# Patient Record
Sex: FEMALE | Race: OTHER | NOT HISPANIC OR LATINO
[De-identification: names, ages, dates, MRNs, and addresses within clinical notes are randomized per-mention and may not be internally consistent; named-entity substitution may affect disease eponyms.]

---

## 2017-05-02 ENCOUNTER — MEDICATION RENEWAL (OUTPATIENT)
Age: 37
End: 2017-05-02

## 2017-06-27 ENCOUNTER — OUTPATIENT (OUTPATIENT)
Dept: OUTPATIENT SERVICES | Facility: HOSPITAL | Age: 37
LOS: 1 days | Discharge: HOME | End: 2017-06-27

## 2017-06-27 DIAGNOSIS — Z85.048 PERSONAL HISTORY OF OTHER MALIGNANT NEOPLASM OF RECTUM, RECTOSIGMOID JUNCTION, AND ANUS: Chronic | ICD-10-CM

## 2017-06-27 DIAGNOSIS — C20 MALIGNANT NEOPLASM OF RECTUM: ICD-10-CM

## 2017-06-27 DIAGNOSIS — Z98.89 OTHER SPECIFIED POSTPROCEDURAL STATES: Chronic | ICD-10-CM

## 2017-07-03 ENCOUNTER — APPOINTMENT (OUTPATIENT)
Dept: HEMATOLOGY ONCOLOGY | Facility: CLINIC | Age: 37
End: 2017-07-03

## 2017-07-03 ENCOUNTER — RESULT REVIEW (OUTPATIENT)
Age: 37
End: 2017-07-03

## 2017-07-03 ENCOUNTER — APPOINTMENT (OUTPATIENT)
Dept: GERIATRICS | Facility: CLINIC | Age: 37
End: 2017-07-03

## 2017-07-03 VITALS
BODY MASS INDEX: 23.38 KG/M2 | WEIGHT: 137.13 LBS | TEMPERATURE: 98.4 F | HEART RATE: 60 BPM | OXYGEN SATURATION: 97 % | DIASTOLIC BLOOD PRESSURE: 71 MMHG | SYSTOLIC BLOOD PRESSURE: 110 MMHG | RESPIRATION RATE: 16 BRPM

## 2017-07-03 LAB
BASOPHILS # BLD AUTO: 0 K/UL — SIGNIFICANT CHANGE UP (ref 0–0.2)
EOSINOPHIL # BLD AUTO: 0 K/UL — SIGNIFICANT CHANGE UP (ref 0–0.5)
EOSINOPHIL NFR BLD AUTO: 3 % — SIGNIFICANT CHANGE UP (ref 0–6)
HCT VFR BLD CALC: 36.7 % — SIGNIFICANT CHANGE UP (ref 34.5–45)
HGB BLD-MCNC: 12.6 G/DL — SIGNIFICANT CHANGE UP (ref 11.5–15.5)
LYMPHOCYTES # BLD AUTO: 0.9 K/UL — LOW (ref 1–3.3)
LYMPHOCYTES # BLD AUTO: 34 % — SIGNIFICANT CHANGE UP (ref 13–44)
MCHC RBC-ENTMCNC: 32.6 PG — SIGNIFICANT CHANGE UP (ref 27–34)
MCHC RBC-ENTMCNC: 34.2 G/DL — SIGNIFICANT CHANGE UP (ref 32–36)
MCV RBC AUTO: 95.2 FL — SIGNIFICANT CHANGE UP (ref 80–100)
MONOCYTES # BLD AUTO: 0.2 K/UL — SIGNIFICANT CHANGE UP (ref 0–0.9)
MONOCYTES NFR BLD AUTO: 5 % — SIGNIFICANT CHANGE UP (ref 2–14)
NEUTROPHILS # BLD AUTO: 1.5 K/UL — LOW (ref 1.8–7.4)
NEUTROPHILS NFR BLD AUTO: 58 % — SIGNIFICANT CHANGE UP (ref 43–77)
PLAT MORPH BLD: NORMAL — SIGNIFICANT CHANGE UP
PLATELET # BLD AUTO: 218 K/UL — SIGNIFICANT CHANGE UP (ref 150–400)
RBC # BLD: 3.86 M/UL — SIGNIFICANT CHANGE UP (ref 3.8–5.2)
RBC # FLD: 10.9 % — SIGNIFICANT CHANGE UP (ref 10.3–14.5)
RBC BLD AUTO: SIGNIFICANT CHANGE UP
WBC # BLD: 2.6 K/UL — LOW (ref 3.8–10.5)
WBC # FLD AUTO: 2.6 K/UL — LOW (ref 3.8–10.5)

## 2017-07-26 ENCOUNTER — FORM ENCOUNTER (OUTPATIENT)
Age: 37
End: 2017-07-26

## 2017-07-27 ENCOUNTER — OUTPATIENT (OUTPATIENT)
Dept: OUTPATIENT SERVICES | Facility: HOSPITAL | Age: 37
LOS: 1 days | End: 2017-07-27
Payer: COMMERCIAL

## 2017-07-27 DIAGNOSIS — Z98.89 OTHER SPECIFIED POSTPROCEDURAL STATES: Chronic | ICD-10-CM

## 2017-07-27 DIAGNOSIS — C20 MALIGNANT NEOPLASM OF RECTUM: ICD-10-CM

## 2017-07-27 DIAGNOSIS — Z85.048 PERSONAL HISTORY OF OTHER MALIGNANT NEOPLASM OF RECTUM, RECTOSIGMOID JUNCTION, AND ANUS: Chronic | ICD-10-CM

## 2017-07-27 PROCEDURE — 36590 REMOVAL TUNNELED CV CATH: CPT

## 2017-07-31 DIAGNOSIS — Z45.2 ENCOUNTER FOR ADJUSTMENT AND MANAGEMENT OF VASCULAR ACCESS DEVICE: ICD-10-CM

## 2017-07-31 DIAGNOSIS — C20 MALIGNANT NEOPLASM OF RECTUM: ICD-10-CM

## 2017-08-31 RX ORDER — SENNOSIDES 8.6 MG/1
8.6 CAPSULE, GELATIN COATED ORAL
Qty: 1 | Refills: 2 | Status: ACTIVE | COMMUNITY
Start: 2017-07-03 | End: 1900-01-01

## 2017-12-07 ENCOUNTER — APPOINTMENT (OUTPATIENT)
Dept: GERIATRICS | Facility: CLINIC | Age: 37
End: 2017-12-07

## 2017-12-11 ENCOUNTER — APPOINTMENT (OUTPATIENT)
Dept: GERIATRICS | Facility: CLINIC | Age: 37
End: 2017-12-11
Payer: COMMERCIAL

## 2017-12-11 VITALS
BODY MASS INDEX: 23.04 KG/M2 | WEIGHT: 135.14 LBS | DIASTOLIC BLOOD PRESSURE: 64 MMHG | RESPIRATION RATE: 16 BRPM | SYSTOLIC BLOOD PRESSURE: 113 MMHG | HEART RATE: 77 BPM | TEMPERATURE: 98.2 F | OXYGEN SATURATION: 100 %

## 2017-12-11 PROCEDURE — 99215 OFFICE O/P EST HI 40 MIN: CPT

## 2018-03-19 ENCOUNTER — APPOINTMENT (OUTPATIENT)
Dept: COLORECTAL SURGERY | Facility: CLINIC | Age: 38
End: 2018-03-19
Payer: COMMERCIAL

## 2018-03-19 ENCOUNTER — LABORATORY RESULT (OUTPATIENT)
Age: 38
End: 2018-03-19

## 2018-03-19 PROCEDURE — 99214 OFFICE O/P EST MOD 30 MIN: CPT

## 2018-03-19 RX ORDER — CEPHALEXIN 500 MG/1
500 CAPSULE ORAL
Qty: 14 | Refills: 0 | Status: DISCONTINUED | COMMUNITY
Start: 2017-07-31 | End: 2018-03-19

## 2018-05-07 ENCOUNTER — APPOINTMENT (OUTPATIENT)
Dept: COLORECTAL SURGERY | Facility: CLINIC | Age: 38
End: 2018-05-07
Payer: COMMERCIAL

## 2018-05-07 PROCEDURE — 99213 OFFICE O/P EST LOW 20 MIN: CPT

## 2018-05-10 ENCOUNTER — APPOINTMENT (OUTPATIENT)
Dept: COLORECTAL SURGERY | Facility: CLINIC | Age: 38
End: 2018-05-10

## 2018-05-10 ENCOUNTER — APPOINTMENT (OUTPATIENT)
Dept: COLORECTAL SURGERY | Facility: CLINIC | Age: 38
End: 2018-05-10
Payer: COMMERCIAL

## 2018-05-10 DIAGNOSIS — K62.89 OTHER SPECIFIED DISEASES OF ANUS AND RECTUM: ICD-10-CM

## 2018-05-10 DIAGNOSIS — Z98.890 OTHER SPECIFIED POSTPROCEDURAL STATES: ICD-10-CM

## 2018-05-10 DIAGNOSIS — K62.5 HEMORRHAGE OF ANUS AND RECTUM: ICD-10-CM

## 2018-05-10 PROCEDURE — 99213 OFFICE O/P EST LOW 20 MIN: CPT | Mod: 25

## 2018-05-10 PROCEDURE — 45378 DIAGNOSTIC COLONOSCOPY: CPT

## 2018-11-01 ENCOUNTER — APPOINTMENT (OUTPATIENT)
Dept: GERIATRICS | Facility: CLINIC | Age: 38
End: 2018-11-01
Payer: COMMERCIAL

## 2018-11-01 VITALS
DIASTOLIC BLOOD PRESSURE: 60 MMHG | RESPIRATION RATE: 15 BRPM | HEIGHT: 64.21 IN | WEIGHT: 149 LBS | OXYGEN SATURATION: 98 % | BODY MASS INDEX: 25.44 KG/M2 | SYSTOLIC BLOOD PRESSURE: 90 MMHG | HEART RATE: 89 BPM | TEMPERATURE: 98.7 F

## 2018-11-01 PROCEDURE — G0008: CPT

## 2018-11-01 PROCEDURE — 99214 OFFICE O/P EST MOD 30 MIN: CPT | Mod: 25

## 2018-11-01 PROCEDURE — 90688 IIV4 VACCINE SPLT 0.5 ML IM: CPT

## 2018-12-06 ENCOUNTER — APPOINTMENT (OUTPATIENT)
Dept: GERIATRICS | Facility: CLINIC | Age: 38
End: 2018-12-06

## 2019-01-05 ENCOUNTER — RX RENEWAL (OUTPATIENT)
Age: 39
End: 2019-01-05

## 2019-02-11 ENCOUNTER — RX RENEWAL (OUTPATIENT)
Age: 39
End: 2019-02-11

## 2019-03-21 ENCOUNTER — RX RENEWAL (OUTPATIENT)
Age: 39
End: 2019-03-21

## 2019-04-22 ENCOUNTER — RX RENEWAL (OUTPATIENT)
Age: 39
End: 2019-04-22

## 2019-05-21 ENCOUNTER — RX RENEWAL (OUTPATIENT)
Age: 39
End: 2019-05-21

## 2019-06-21 ENCOUNTER — RX RENEWAL (OUTPATIENT)
Age: 39
End: 2019-06-21

## 2019-07-22 ENCOUNTER — RX RENEWAL (OUTPATIENT)
Age: 39
End: 2019-07-22

## 2019-08-22 ENCOUNTER — RX RENEWAL (OUTPATIENT)
Age: 39
End: 2019-08-22

## 2019-08-28 ENCOUNTER — APPOINTMENT (OUTPATIENT)
Dept: HEMATOLOGY ONCOLOGY | Facility: CLINIC | Age: 39
End: 2019-08-28
Payer: COMMERCIAL

## 2019-08-28 ENCOUNTER — OUTPATIENT (OUTPATIENT)
Dept: OUTPATIENT SERVICES | Facility: HOSPITAL | Age: 39
LOS: 1 days | Discharge: HOME | End: 2019-08-28

## 2019-08-28 VITALS
TEMPERATURE: 98.7 F | OXYGEN SATURATION: 100 % | SYSTOLIC BLOOD PRESSURE: 107 MMHG | RESPIRATION RATE: 16 BRPM | HEART RATE: 71 BPM | BODY MASS INDEX: 24.81 KG/M2 | DIASTOLIC BLOOD PRESSURE: 69 MMHG | WEIGHT: 145.48 LBS

## 2019-08-28 DIAGNOSIS — Z85.048 PERSONAL HISTORY OF OTHER MALIGNANT NEOPLASM OF RECTUM, RECTOSIGMOID JUNCTION, AND ANUS: Chronic | ICD-10-CM

## 2019-08-28 DIAGNOSIS — C20 MALIGNANT NEOPLASM OF RECTUM: ICD-10-CM

## 2019-08-28 DIAGNOSIS — Z98.89 OTHER SPECIFIED POSTPROCEDURAL STATES: Chronic | ICD-10-CM

## 2019-08-28 PROCEDURE — 99213 OFFICE O/P EST LOW 20 MIN: CPT

## 2019-08-29 NOTE — HISTORY OF PRESENT ILLNESS
[FreeTextEntry1] : 39yoF with rectal adenocarcinoma (dx 7/2015) s/p neoadjuvant chemo with Capcetabine, resection with loop ileostomy (12/2015) and adjuvant chemo with XELOX with subsequent ileostomy closure (7/2016) who presents for palliative care follow up. Patient last seen by me 11/2018. \par \par Patient's remains on Methadone 5mg TID for her CIPN. She previously tried to taper down however her pains/cramping worsened and she went back to usual dose.  She uses occasional NSAID. Cold temperatures cause her to experience muscle stiffness/cramping. \par \par ROS:\par Denies nausea/vomiting, constipation.  Occasionally gets bloated/gassy and this can exacerbate pain at surgical site. All other ROS negative.\par \par Works full time in a dental office. \par \par I-Stop Ref#: [id number]

## 2019-08-29 NOTE — ASSESSMENT
[FreeTextEntry1] : 39yoF with:\par \par 1. Oxaliplatin-induced peripheral neuropathy, opioid-responsive - Again discussed goal of doing a slow taper of Methadone with goal of ultimately ceasing opioid use altogether. Patient is agreeable. Adjust dosing to 5/2.5/5.  Can use PRN NSAID or Tylenol to help with muscle cramping. \par \par 2. Rectal adenocarcinoma - s/p therapies, now with ANGEL. Surveillance as Surg Onc/Med Onc.  Last scan 3/2018, last colonoscopy 5/2018.  Advised patient of importance of regular oncologic surveillance. \par \par 3. HCM - Advised patient to establish with a PCP and Gynecologist for well care. \par \par RTO 6 months, call to update on status of taper

## 2019-08-29 NOTE — PHYSICAL EXAM
[General Appearance - Alert] : alert [General Appearance - In No Acute Distress] : in no acute distress [Sclera] : the sclera and conjunctiva were normal [Normal Oral Mucosa] : normal oral mucosa [Neck Appearance] : the appearance of the neck was normal [] : no respiratory distress [Heart Rate And Rhythm] : heart rate was normal and rhythm regular [Auscultation Breath Sounds / Voice Sounds] : lungs were clear to auscultation bilaterally [Heart Sounds] : normal S1 and S2 [Edema] : there was no peripheral edema [Bowel Sounds] : normal bowel sounds [Abnormal Walk] : normal gait [Skin Color & Pigmentation] : normal skin color and pigmentation [Oriented To Time, Place, And Person] : oriented to person, place, and time [FreeTextEntry1] : normal proprioception of b/l toes

## 2019-09-27 ENCOUNTER — RX RENEWAL (OUTPATIENT)
Age: 39
End: 2019-09-27

## 2019-10-22 ENCOUNTER — OUTPATIENT (OUTPATIENT)
Dept: OUTPATIENT SERVICES | Facility: HOSPITAL | Age: 39
LOS: 1 days | Discharge: HOME | End: 2019-10-22

## 2019-10-22 DIAGNOSIS — C20 MALIGNANT NEOPLASM OF RECTUM: ICD-10-CM

## 2019-10-22 DIAGNOSIS — Z85.048 PERSONAL HISTORY OF OTHER MALIGNANT NEOPLASM OF RECTUM, RECTOSIGMOID JUNCTION, AND ANUS: Chronic | ICD-10-CM

## 2019-10-22 DIAGNOSIS — Z98.89 OTHER SPECIFIED POSTPROCEDURAL STATES: Chronic | ICD-10-CM

## 2019-11-01 ENCOUNTER — APPOINTMENT (OUTPATIENT)
Dept: HEMATOLOGY ONCOLOGY | Facility: CLINIC | Age: 39
End: 2019-11-01
Payer: COMMERCIAL

## 2019-11-01 ENCOUNTER — RESULT REVIEW (OUTPATIENT)
Age: 39
End: 2019-11-01

## 2019-11-01 VITALS
DIASTOLIC BLOOD PRESSURE: 74 MMHG | WEIGHT: 146.17 LBS | OXYGEN SATURATION: 98 % | HEART RATE: 80 BPM | TEMPERATURE: 98.1 F | BODY MASS INDEX: 24.93 KG/M2 | RESPIRATION RATE: 17 BRPM | SYSTOLIC BLOOD PRESSURE: 115 MMHG

## 2019-11-01 LAB
ALBUMIN SERPL ELPH-MCNC: 4.6 G/DL
ALP BLD-CCNC: 49 U/L
ALT SERPL-CCNC: 6 U/L
ANION GAP SERPL CALC-SCNC: 13 MMOL/L
AST SERPL-CCNC: 14 U/L
BASOPHILS # BLD AUTO: 0 K/UL — SIGNIFICANT CHANGE UP (ref 0–0.2)
BASOPHILS NFR BLD AUTO: 1 % — SIGNIFICANT CHANGE UP (ref 0–2)
BILIRUB SERPL-MCNC: <0.2 MG/DL
BUN SERPL-MCNC: 13 MG/DL
CALCIUM SERPL-MCNC: 9.5 MG/DL
CEA SERPL-MCNC: 1.1 NG/ML
CHLORIDE SERPL-SCNC: 103 MMOL/L
CO2 SERPL-SCNC: 26 MMOL/L
CREAT SERPL-MCNC: 0.69 MG/DL
EOSINOPHIL # BLD AUTO: 0.1 K/UL — SIGNIFICANT CHANGE UP (ref 0–0.5)
EOSINOPHIL NFR BLD AUTO: 2.5 % — SIGNIFICANT CHANGE UP (ref 0–6)
GLUCOSE SERPL-MCNC: 89 MG/DL
HCT VFR BLD CALC: 38.5 % — SIGNIFICANT CHANGE UP (ref 34.5–45)
HGB BLD-MCNC: 12.7 G/DL — SIGNIFICANT CHANGE UP (ref 11.5–15.5)
LYMPHOCYTES # BLD AUTO: 1.1 K/UL — SIGNIFICANT CHANGE UP (ref 1–3.3)
LYMPHOCYTES # BLD AUTO: 31.5 % — SIGNIFICANT CHANGE UP (ref 13–44)
MCHC RBC-ENTMCNC: 31.6 PG — SIGNIFICANT CHANGE UP (ref 27–34)
MCHC RBC-ENTMCNC: 32.9 G/DL — SIGNIFICANT CHANGE UP (ref 32–36)
MCV RBC AUTO: 95.9 FL — SIGNIFICANT CHANGE UP (ref 80–100)
MONOCYTES # BLD AUTO: 0.2 K/UL — SIGNIFICANT CHANGE UP (ref 0–0.9)
MONOCYTES NFR BLD AUTO: 5 % — SIGNIFICANT CHANGE UP (ref 2–14)
NEUTROPHILS # BLD AUTO: 2.1 K/UL — SIGNIFICANT CHANGE UP (ref 1.8–7.4)
NEUTROPHILS NFR BLD AUTO: 60 % — SIGNIFICANT CHANGE UP (ref 43–77)
PLATELET # BLD AUTO: 215 K/UL — SIGNIFICANT CHANGE UP (ref 150–400)
POTASSIUM SERPL-SCNC: 3.9 MMOL/L
PROT SERPL-MCNC: 7.2 G/DL
RBC # BLD: 4.02 M/UL — SIGNIFICANT CHANGE UP (ref 3.8–5.2)
RBC # FLD: 10.9 % — SIGNIFICANT CHANGE UP (ref 10.3–14.5)
SODIUM SERPL-SCNC: 142 MMOL/L
WBC # BLD: 3.5 K/UL — LOW (ref 3.8–10.5)
WBC # FLD AUTO: 3.5 K/UL — LOW (ref 3.8–10.5)

## 2019-11-01 PROCEDURE — 99215 OFFICE O/P EST HI 40 MIN: CPT

## 2019-11-01 NOTE — PHYSICAL EXAM
[Fully active, able to carry on all pre-disease performance without restriction] : Status 0 - Fully active, able to carry on all pre-disease performance without restriction [Normal] : affect appropriate [de-identified] : wound healing well, nontender and soft, ostomy bag in place

## 2019-11-01 NOTE — ASSESSMENT
[FreeTextEntry1] : Real Kimble is a 39 years old female with newly diagnosed locally advanced rectal adenocarcinoma with clinical stage T3N2Mx completed concurrent capecitabine and radiation on 10/26 and rectal cancer resection on Dec 1, 2015 s/p C6 XELOX adjuvant chemo in the beginning of May, 2016. \par \par Plan\par \par had a discussion with the patient around 40 minutes, she needs H and P every three months in first two years, every 6 months up to 5 years\par CT c/a/p in every 6 months in first two years, annual after up to 5 years, needs colonoscopy year 1, then year 3 and 5 if normal\par will have CT chest/abdomen/pelvis w/con\par will need to see her GI for recent blood in the stools\par check labs including CEA today\par recommend genetic counseling given young age with rectal cancer although no family history\par RTC in six months\par \par \par

## 2019-11-01 NOTE — DISCUSSION/SUMMARY
[FreeTextEntry1] : Spoke with her  to make a follow up appointment with me ASAP given last surveillance scans in August 2016. He promised to do it soon.

## 2019-11-01 NOTE — RESULTS/DATA
[FreeTextEntry1] : July 21, 2015\par CBC 3.7>11.4/38.0<331\par Bun/Cr 6/0.64\par LFT wnl\par \par 10/29\par CBC: 3.4>11.2<190\par \par \par

## 2019-11-01 NOTE — REVIEW OF SYSTEMS
[Constipation] : constipation [Joint Pain] : joint pain [Negative] : Allergic/Immunologic [Fever] : no fever [Fatigue] : no fatigue [Recent Change In Weight] : ~T no recent weight change [Shortness Of Breath] : no shortness of breath [SOB on Exertion] : no shortness of breath during exertion [Suicidal] : not suicidal [Insomnia] : no insomnia [Anxiety] : no anxiety [Depression] : no depression

## 2019-11-01 NOTE — HISTORY OF PRESENT ILLNESS
[T: ___] : T[unfilled] [N: ___] : N[unfilled] [M: ___] : M[unfilled] [de-identified] : Real Kimble is a 35 years old female who initially presented with fresh red blood in the stool in February, 2015, associated with rectal pain. The patient thought it was just hemorrhoids. Until she felt weak, tired with loss of 50lbs in 6 months. She does not have good appetite . She went to see hemorrhoids physician who referred patient to see GI physician Dr. Daren Gifford. She underwent colonoscopy and was found to have a large, fungating, partially obstructing mass in the rectum, close to the anal verge. BIopsy demonstrated invasive moderately differentiated adenocarcinoma. MRI abdomen/pelvis revealed a 5.1cm circumferential mass in the mid rectum, numerous subcentimeter abnormal morphology nodes in the mesorectal fascia, within the deep right pelvis outside of the mesorectal fascia and in the presacral space.  [de-identified] : rectal adenocarcinoma [de-identified] : CEA 7/21/15: 0.9 [de-identified] : The patient underwent anterior resection with a diverting loop ileostomy on Dec 1, 2015. Surgical pathology demonstrated segment of colon negative for malignant neoplasm and 20 lymph nodes negative for tumor, pT0N0. Patient denies any abdominal pain. \par \par She had ileostomy closed surgery in the end of July, 2016, developed anal pain when having bowel movement. CT-Scan Abd/pelvis/chest of 08/16/16 shows:  Interval  ileostomy  reversal. Presacral  fluid  has  mildly  decreased.  No  metastatic  disease.\par \par S/p XELOX /radiation completion in May 2016. She is feeling fine except she is c/o difficulty with bowel movement and she takes extremely long time to have bowel movement. Admits she has + constipation and + blood in the stools in last a couple of months. Also, she is still requiring pain medication Methadone 5 mg tid 2/2 to persistent numbness/tingling. Denies any fever, diarrhea, nausea and her appetite is good. \par \par 11/1/2019 she has lost her follow up in greater than two years. She feels fine, denies blood in the stool, still complains of tingling and numbness in her fingers and toes, currently on methadone 5mg TID, follows up with Dr. Cheatham. She had last surveillance CT scan on March 24, 2018 which did not show evidence of recurrence. Last colonoscopy In May 2018 showed normal colon. She has lost period in 3 years. \par

## 2019-12-06 ENCOUNTER — RX RENEWAL (OUTPATIENT)
Age: 39
End: 2019-12-06

## 2020-01-10 ENCOUNTER — RX RENEWAL (OUTPATIENT)
Age: 40
End: 2020-01-10

## 2020-03-09 ENCOUNTER — OUTPATIENT (OUTPATIENT)
Dept: OUTPATIENT SERVICES | Facility: HOSPITAL | Age: 40
LOS: 1 days | Discharge: HOME | End: 2020-03-09

## 2020-03-09 DIAGNOSIS — C20 MALIGNANT NEOPLASM OF RECTUM: ICD-10-CM

## 2020-03-09 DIAGNOSIS — Z98.89 OTHER SPECIFIED POSTPROCEDURAL STATES: Chronic | ICD-10-CM

## 2020-03-09 DIAGNOSIS — Z85.048 PERSONAL HISTORY OF OTHER MALIGNANT NEOPLASM OF RECTUM, RECTOSIGMOID JUNCTION, AND ANUS: Chronic | ICD-10-CM

## 2020-03-11 ENCOUNTER — APPOINTMENT (OUTPATIENT)
Dept: HEMATOLOGY ONCOLOGY | Facility: CLINIC | Age: 40
End: 2020-03-11
Payer: COMMERCIAL

## 2020-03-11 VITALS
HEART RATE: 64 BPM | TEMPERATURE: 98.7 F | BODY MASS INDEX: 25.3 KG/M2 | OXYGEN SATURATION: 99 % | RESPIRATION RATE: 16 BRPM | SYSTOLIC BLOOD PRESSURE: 106 MMHG | DIASTOLIC BLOOD PRESSURE: 68 MMHG | WEIGHT: 148.37 LBS

## 2020-03-11 PROCEDURE — 99214 OFFICE O/P EST MOD 30 MIN: CPT

## 2020-03-12 NOTE — PHYSICAL EXAM
[General Appearance - Alert] : alert [General Appearance - In No Acute Distress] : in no acute distress [Sclera] : the sclera and conjunctiva were normal [Normal Oral Mucosa] : normal oral mucosa [Neck Appearance] : the appearance of the neck was normal [] : no respiratory distress [Auscultation Breath Sounds / Voice Sounds] : lungs were clear to auscultation bilaterally [Heart Rate And Rhythm] : heart rate was normal and rhythm regular [Heart Sounds] : normal S1 and S2 [Edema] : there was no peripheral edema [Bowel Sounds] : normal bowel sounds [Abnormal Walk] : normal gait [Skin Color & Pigmentation] : normal skin color and pigmentation [Oriented To Time, Place, And Person] : oriented to person, place, and time [FreeTextEntry1] : normal proprioception of b/l toes

## 2020-03-12 NOTE — ASSESSMENT
[FreeTextEntry1] : 39yoF with:\par \par 1. Oxaliplatin-induced peripheral neuropathy, opioid-responsive - Re-discussed goal of doing a slow taper of Methadone with goal of ultimately ceasing opioid use altogether. Patient is agreeable. Adjust dosing to 5mg BID.  Can use PRN NSAID or Tylenol to help with muscle cramping. \par \par 2. Opioid-induced constipation - C/w lactulose PRN.\par \par 3. Rectal adenocarcinoma - s/p therapies, now with ANGEL. Surveillance as Surg Onc/Med Onc. Last scan 3/2018, last colonoscopy 5/2018.   Is aware that scans were ordered 11/2019.  Advised patient of importance of obtaining scans urgently especially given episode of blood in stool.   Scans re-ordered. \par \par 4. HCM - Advised patient to establish with a PCP and Gynecologist for well care. \par \par \par RTO 6 months, call to update on status of taper.

## 2020-03-12 NOTE — HISTORY OF PRESENT ILLNESS
[FreeTextEntry1] : 39yoF with rectal adenocarcinoma (dx 7/2015) s/p neoadjuvant chemo with Capcetabine, resection with loop ileostomy (12/2015) and adjuvant chemo with XELOX with subsequent ileostomy closure (7/2016) who presents for palliative care follow up. Patient last seen by me 8/2019. \par \par Patient's remains on Methadone 5mg TID for her CIPN. She previously tried to taper down however her pains/cramping worsened and she went back to usual dose.  She uses occasional NSAID. Cold temperatures cause her to experience muscle stiffness/cramping. \par \par Patient reports an isolated episode of BRBPR two days ago.  Scans were ordered in November.  She has not obtained as of yet. \par \par ROS:\par Denies nausea/vomiting, constipation.  Occasionally gets bloated/gassy and this can exacerbate pain at surgical site. \par All other ROS negative.\par \par She is , has two children.  Works full time in a dental office. \par \par I-Stop Ref#: [id number]

## 2020-03-20 ENCOUNTER — APPOINTMENT (OUTPATIENT)
Dept: CT IMAGING | Facility: CLINIC | Age: 40
End: 2020-03-20

## 2020-04-24 ENCOUNTER — OUTPATIENT (OUTPATIENT)
Dept: OUTPATIENT SERVICES | Facility: HOSPITAL | Age: 40
LOS: 1 days | Discharge: HOME | End: 2020-04-24

## 2020-04-24 DIAGNOSIS — Z85.048 PERSONAL HISTORY OF OTHER MALIGNANT NEOPLASM OF RECTUM, RECTOSIGMOID JUNCTION, AND ANUS: Chronic | ICD-10-CM

## 2020-04-24 DIAGNOSIS — Z98.89 OTHER SPECIFIED POSTPROCEDURAL STATES: Chronic | ICD-10-CM

## 2020-04-24 DIAGNOSIS — C20 MALIGNANT NEOPLASM OF RECTUM: ICD-10-CM

## 2020-05-01 ENCOUNTER — APPOINTMENT (OUTPATIENT)
Dept: HEMATOLOGY ONCOLOGY | Facility: CLINIC | Age: 40
End: 2020-05-01

## 2020-06-12 ENCOUNTER — RESULT REVIEW (OUTPATIENT)
Age: 40
End: 2020-06-12

## 2020-06-12 ENCOUNTER — OUTPATIENT (OUTPATIENT)
Dept: OUTPATIENT SERVICES | Facility: HOSPITAL | Age: 40
LOS: 1 days | End: 2020-06-12
Payer: COMMERCIAL

## 2020-06-12 ENCOUNTER — APPOINTMENT (OUTPATIENT)
Dept: CT IMAGING | Facility: CLINIC | Age: 40
End: 2020-06-12
Payer: COMMERCIAL

## 2020-06-12 DIAGNOSIS — Z85.048 PERSONAL HISTORY OF OTHER MALIGNANT NEOPLASM OF RECTUM, RECTOSIGMOID JUNCTION, AND ANUS: Chronic | ICD-10-CM

## 2020-06-12 DIAGNOSIS — Z00.8 ENCOUNTER FOR OTHER GENERAL EXAMINATION: ICD-10-CM

## 2020-06-12 DIAGNOSIS — Z98.89 OTHER SPECIFIED POSTPROCEDURAL STATES: Chronic | ICD-10-CM

## 2020-06-12 PROCEDURE — 74177 CT ABD & PELVIS W/CONTRAST: CPT | Mod: 26

## 2020-06-12 PROCEDURE — 71260 CT THORAX DX C+: CPT

## 2020-06-12 PROCEDURE — 71260 CT THORAX DX C+: CPT | Mod: 26

## 2020-06-12 PROCEDURE — 74177 CT ABD & PELVIS W/CONTRAST: CPT

## 2020-06-23 ENCOUNTER — OUTPATIENT (OUTPATIENT)
Dept: OUTPATIENT SERVICES | Facility: HOSPITAL | Age: 40
LOS: 1 days | Discharge: HOME | End: 2020-06-23

## 2020-06-23 DIAGNOSIS — Z85.048 PERSONAL HISTORY OF OTHER MALIGNANT NEOPLASM OF RECTUM, RECTOSIGMOID JUNCTION, AND ANUS: Chronic | ICD-10-CM

## 2020-06-23 DIAGNOSIS — Z98.89 OTHER SPECIFIED POSTPROCEDURAL STATES: Chronic | ICD-10-CM

## 2020-06-23 DIAGNOSIS — C20 MALIGNANT NEOPLASM OF RECTUM: ICD-10-CM

## 2020-07-10 ENCOUNTER — APPOINTMENT (OUTPATIENT)
Dept: HEMATOLOGY ONCOLOGY | Facility: CLINIC | Age: 40
End: 2020-07-10
Payer: COMMERCIAL

## 2020-07-10 PROCEDURE — 99214 OFFICE O/P EST MOD 30 MIN: CPT | Mod: 95

## 2020-07-10 NOTE — HISTORY OF PRESENT ILLNESS
[Home] : at home, [unfilled] , at the time of the visit. [Medical Office: ([facility])___] : at the medical office located in  [T: ___] : T[unfilled] [N: ___] : N[unfilled] [M: ___] : M[unfilled] [de-identified] : Real Kimble is a 35 years old female who initially presented with fresh red blood in the stool in February, 2015, associated with rectal pain. The patient thought it was just hemorrhoids. Until she felt weak, tired with loss of 50lbs in 6 months. She does not have good appetite . She went to see hemorrhoids physician who referred patient to see GI physician Dr. Daren Gifford. She underwent colonoscopy and was found to have a large, fungating, partially obstructing mass in the rectum, close to the anal verge. BIopsy demonstrated invasive moderately differentiated adenocarcinoma. MRI abdomen/pelvis revealed a 5.1cm circumferential mass in the mid rectum, numerous subcentimeter abnormal morphology nodes in the mesorectal fascia, within the deep right pelvis outside of the mesorectal fascia and in the presacral space.  [de-identified] : rectal adenocarcinoma [de-identified] : CEA 7/21/15: 0.9 [de-identified] : The patient underwent anterior resection with a diverting loop ileostomy on Dec 1, 2015. Surgical pathology demonstrated segment of colon negative for malignant neoplasm and 20 lymph nodes negative for tumor, pT0N0. Patient denies any abdominal pain. \par \par She had ileostomy closed surgery in the end of July, 2016, developed anal pain when having bowel movement. CT-Scan Abd/pelvis/chest of 08/16/16 shows:  Interval  ileostomy  reversal. Presacral  fluid  has  mildly  decreased.  No  metastatic  disease.\par \par S/p XELOX /radiation completion in May 2016. She is feeling fine except she is c/o difficulty with bowel movement and she takes extremely long time to have bowel movement. Admits she has + constipation and + blood in the stools in last a couple of months. Also, she is still requiring pain medication Methadone 5 mg tid 2/2 to persistent numbness/tingling. Denies any fever, diarrhea, nausea and her appetite is good. \par \par 11/1/2019 she has lost her follow up in greater than two years. She feels fine, denies blood in the stool, still complains of tingling and numbness in her fingers and toes, currently on methadone 5mg TID, follows up with Dr. Cheatham. She had last surveillance CT scan on March 24, 2018 which did not show evidence of recurrence. Last colonoscopy In May 2018 showed normal colon. She has lost period in 3 years. \par \par 6/12/2020 CT c/a/p showed  indeterminate 2 x 1.7 cm centrally cystic focus in the presacral region---needs follow up. \par \par 7/10/2020 She has anal pain in last one year, denies blood in the stool, still complains of tingling and numbness in her fingers and toes, currently on methadone 5mg TID, follows up with Dr. Cheatham.

## 2020-07-10 NOTE — PHYSICAL EXAM
[Fully active, able to carry on all pre-disease performance without restriction] : Status 0 - Fully active, able to carry on all pre-disease performance without restriction [Normal] : affect appropriate [de-identified] : wound healing well, nontender and soft, ostomy bag in place

## 2020-07-10 NOTE — ASSESSMENT
[FreeTextEntry1] : Real Kimble is a 39 years old female with newly diagnosed locally advanced rectal adenocarcinoma with clinical stage T3N2Mx completed concurrent capecitabine and radiation on 10/26 and rectal cancer resection on Dec 1, 2015 s/p C6 XELOX adjuvant chemo in the beginning of May, 2016. CT scan in June showed 2.0cm a cystic lesion in presacral area, needs a short follow up. \par \par Plan\par \par need to follow up CT scan in 3 months for a presacral cystic lesion\par need to follow up Dr. Bai for colonosocpy\par labs including CEA soon\par recommend genetic counseling given young age with rectal cancer although no family history\par RTC in 3 months\par \par \par

## 2020-07-15 ENCOUNTER — APPOINTMENT (OUTPATIENT)
Dept: HEMATOLOGY ONCOLOGY | Facility: CLINIC | Age: 40
End: 2020-07-15

## 2020-07-15 ENCOUNTER — OUTPATIENT (OUTPATIENT)
Dept: OUTPATIENT SERVICES | Facility: HOSPITAL | Age: 40
LOS: 1 days | Discharge: HOME | End: 2020-07-15

## 2020-07-15 ENCOUNTER — RESULT REVIEW (OUTPATIENT)
Age: 40
End: 2020-07-15

## 2020-07-15 DIAGNOSIS — C20 MALIGNANT NEOPLASM OF RECTUM: ICD-10-CM

## 2020-07-15 DIAGNOSIS — Z98.89 OTHER SPECIFIED POSTPROCEDURAL STATES: Chronic | ICD-10-CM

## 2020-07-15 DIAGNOSIS — Z85.048 PERSONAL HISTORY OF OTHER MALIGNANT NEOPLASM OF RECTUM, RECTOSIGMOID JUNCTION, AND ANUS: Chronic | ICD-10-CM

## 2020-07-15 LAB
BASOPHILS # BLD AUTO: 0.02 K/UL — SIGNIFICANT CHANGE UP (ref 0–0.2)
BASOPHILS NFR BLD AUTO: 0.5 % — SIGNIFICANT CHANGE UP (ref 0–2)
EOSINOPHIL # BLD AUTO: 0.03 K/UL — SIGNIFICANT CHANGE UP (ref 0–0.5)
EOSINOPHIL NFR BLD AUTO: 0.8 % — SIGNIFICANT CHANGE UP (ref 0–6)
HCT VFR BLD CALC: 35.2 % — SIGNIFICANT CHANGE UP (ref 34.5–45)
HGB BLD-MCNC: 11.5 G/DL — SIGNIFICANT CHANGE UP (ref 11.5–15.5)
IMM GRANULOCYTES NFR BLD AUTO: 0.5 % — SIGNIFICANT CHANGE UP (ref 0–1.5)
LYMPHOCYTES # BLD AUTO: 1.36 K/UL — SIGNIFICANT CHANGE UP (ref 1–3.3)
LYMPHOCYTES # BLD AUTO: 35.1 % — SIGNIFICANT CHANGE UP (ref 13–44)
MCHC RBC-ENTMCNC: 31.9 PG — SIGNIFICANT CHANGE UP (ref 27–34)
MCHC RBC-ENTMCNC: 32.7 GM/DL — SIGNIFICANT CHANGE UP (ref 32–36)
MCV RBC AUTO: 97.8 FL — SIGNIFICANT CHANGE UP (ref 80–100)
MONOCYTES # BLD AUTO: 0.17 K/UL — SIGNIFICANT CHANGE UP (ref 0–0.9)
MONOCYTES NFR BLD AUTO: 4.4 % — SIGNIFICANT CHANGE UP (ref 2–14)
NEUTROPHILS # BLD AUTO: 2.28 K/UL — SIGNIFICANT CHANGE UP (ref 1.8–7.4)
NEUTROPHILS NFR BLD AUTO: 58.7 % — SIGNIFICANT CHANGE UP (ref 43–77)
NRBC # BLD: 0 /100 WBCS — SIGNIFICANT CHANGE UP (ref 0–0)
PLATELET # BLD AUTO: 239 K/UL — SIGNIFICANT CHANGE UP (ref 150–400)
RBC # BLD: 3.6 M/UL — LOW (ref 3.8–5.2)
RBC # FLD: 12.1 % — SIGNIFICANT CHANGE UP (ref 10.3–14.5)
WBC # BLD: 3.88 K/UL — SIGNIFICANT CHANGE UP (ref 3.8–10.5)
WBC # FLD AUTO: 3.88 K/UL — SIGNIFICANT CHANGE UP (ref 3.8–10.5)

## 2020-08-05 ENCOUNTER — APPOINTMENT (OUTPATIENT)
Dept: COLORECTAL SURGERY | Facility: CLINIC | Age: 40
End: 2020-08-05

## 2020-08-12 ENCOUNTER — APPOINTMENT (OUTPATIENT)
Dept: COLORECTAL SURGERY | Facility: CLINIC | Age: 40
End: 2020-08-12

## 2020-08-12 RX ORDER — DOCUSATE SODIUM 100 MG/1
100 CAPSULE ORAL 3 TIMES DAILY
Qty: 90 | Refills: 5 | Status: DISCONTINUED | COMMUNITY
Start: 2017-07-03 | End: 2020-08-12

## 2020-08-12 RX ORDER — LACTULOSE 10 G/15ML
10 SOLUTION ORAL TWICE DAILY
Qty: 1 | Refills: 1 | Status: DISCONTINUED | COMMUNITY
Start: 2017-07-03 | End: 2020-08-12

## 2020-09-05 ENCOUNTER — APPOINTMENT (OUTPATIENT)
Dept: DISASTER EMERGENCY | Facility: CLINIC | Age: 40
End: 2020-09-05

## 2020-09-06 LAB — SARS-COV-2 N GENE NPH QL NAA+PROBE: NOT DETECTED

## 2020-09-10 ENCOUNTER — OUTPATIENT (OUTPATIENT)
Dept: OUTPATIENT SERVICES | Facility: HOSPITAL | Age: 40
LOS: 1 days | Discharge: HOME | End: 2020-09-10

## 2020-09-10 ENCOUNTER — APPOINTMENT (OUTPATIENT)
Dept: COLORECTAL SURGERY | Facility: CLINIC | Age: 40
End: 2020-09-10
Payer: COMMERCIAL

## 2020-09-10 DIAGNOSIS — Z85.048 PERSONAL HISTORY OF OTHER MALIGNANT NEOPLASM OF RECTUM, RECTOSIGMOID JUNCTION, AND ANUS: Chronic | ICD-10-CM

## 2020-09-10 DIAGNOSIS — C20 MALIGNANT NEOPLASM OF RECTUM: ICD-10-CM

## 2020-09-10 DIAGNOSIS — Z98.89 OTHER SPECIFIED POSTPROCEDURAL STATES: Chronic | ICD-10-CM

## 2020-09-10 PROCEDURE — 45378 DIAGNOSTIC COLONOSCOPY: CPT

## 2020-09-14 ENCOUNTER — APPOINTMENT (OUTPATIENT)
Dept: HEMATOLOGY ONCOLOGY | Facility: CLINIC | Age: 40
End: 2020-09-14

## 2020-11-16 ENCOUNTER — NON-APPOINTMENT (OUTPATIENT)
Age: 40
End: 2020-11-16

## 2020-12-10 ENCOUNTER — APPOINTMENT (OUTPATIENT)
Dept: HEMATOLOGY ONCOLOGY | Facility: CLINIC | Age: 40
End: 2020-12-10

## 2020-12-11 ENCOUNTER — OUTPATIENT (OUTPATIENT)
Dept: OUTPATIENT SERVICES | Facility: HOSPITAL | Age: 40
LOS: 1 days | Discharge: HOME | End: 2020-12-11

## 2020-12-11 DIAGNOSIS — Z98.89 OTHER SPECIFIED POSTPROCEDURAL STATES: Chronic | ICD-10-CM

## 2020-12-11 DIAGNOSIS — Z85.048 PERSONAL HISTORY OF OTHER MALIGNANT NEOPLASM OF RECTUM, RECTOSIGMOID JUNCTION, AND ANUS: Chronic | ICD-10-CM

## 2020-12-11 DIAGNOSIS — C20 MALIGNANT NEOPLASM OF RECTUM: ICD-10-CM

## 2020-12-16 ENCOUNTER — APPOINTMENT (OUTPATIENT)
Dept: HEMATOLOGY ONCOLOGY | Facility: CLINIC | Age: 40
End: 2020-12-16
Payer: COMMERCIAL

## 2020-12-16 PROCEDURE — 99213 OFFICE O/P EST LOW 20 MIN: CPT | Mod: 95

## 2020-12-16 NOTE — HISTORY OF PRESENT ILLNESS
[Home] : at home, [unfilled] , at the time of the visit. [Medical Office: ([facility])___] : at the medical office located in  [Verbal consent obtained from patient] : the patient, [unfilled] [FreeTextEntry1] : 40yoF with rectal adenocarcinoma (dx 7/2015) s/p neoadjuvant chemo with Capcetabine, resection with loop ileostomy (12/2015) and adjuvant chemo with XELOX with subsequent ileostomy closure (7/2016) who presents for palliative care follow up visit via Telemedicine. \par \par Patient recently lowered her methadone dose to once daily, in the morning.  She takes Aleve on a PRN basis, usually 2-3 times weekly.  The pain is felt mainly in her upper back, with radiation towards her shoulders.  \par \par Of note, she fell and broke her ankle in September. \par \par ROS:\par +constipation - chronic, since her surgery. Uses lactulose PRN\par Denies nausea/vomiting, constipation.  Occasionally gets bloated/gassy and this can exacerbate pain at surgical site. \par All other ROS negative.\par \par She is , has two children.  Works full time in a dental office. \par \par I-Stop Ref#: [id number]

## 2020-12-16 NOTE — DATA REVIEWED
[FreeTextEntry1] : CT A/P (6/2020) - \par LUNGS AND LARGE AIRWAYS: Patent central airways. No pulmonary nodules. \par PLEURA: No pleural effusion. \par VESSELS: Within normal limits. \par HEART: Heart size is normal. No pericardial effusion. \par MEDIASTINUM AND AARON: No lymphadenopathy. \par CHEST WALL AND LOWER NECK: Within normal limits. \par \par ABDOMEN AND PELVIS: \par LIVER: Within normal limits. \par BILE DUCTS: Normal caliber. \par GALLBLADDER: Within normal limits. \par SPLEEN: Within normal limits. \par PANCREAS: Within normal limits. \par ADRENALS: Within normal limits. \par KIDNEYS/URETERS: Within normal limits. \par \par BLADDER: Within normal limits. \par REPRODUCTIVE ORGANS: Air is present within the endometrial cavity. The \par adnexal regions unremarkable \par \par BOWEL: Status post low anterior resection. Multiple nonopacified small bowel loops extend into the pelvis. There is an indeterminate 2 x 1.7 cm centrally cystic focus in the presacral region (3:255). Suggest short interval follow-up. It is possible that this represents a postoperative focus, but a \par focus of recurrent disease cannot be excluded. No bowel obstruction. \par Appendix is normal. \par PERITONEUM: No ascites. \par VESSELS: Within normal limits. \par RETROPERITONEUM/LYMPH NODES: No lymphadenopathy. \par ABDOMINAL WALL: Within normal limits. \par BONES: Within normal limits. \par \par IMPRESSION: \par No developing adenopathy nor hepatic mass. \par Centrally cystic focus of the presacral area for which short interval \par follow-up or PET CT suggested. \par Air is present within the endometrial cavity.

## 2020-12-16 NOTE — ASSESSMENT
[FreeTextEntry1] : 39yoF with:\par \par 1. Oxaliplatin-induced peripheral neuropathy - C/w attempt to taper off of methadone. \par \par 2. Chronic constipation - C/w lactulose PRN.\par \par 3. Rectal adenocarcinoma - s/p therapies, now with ANGEL. Needs follow up appt with Med Onc. \par \par 4. HCM - Advised patient to establish with a PCP and Gynecologist for well care. \par \par \par Follow up in 3 months

## 2020-12-26 ENCOUNTER — OUTPATIENT (OUTPATIENT)
Dept: OUTPATIENT SERVICES | Facility: HOSPITAL | Age: 40
LOS: 1 days | End: 2020-12-26
Payer: COMMERCIAL

## 2020-12-26 ENCOUNTER — APPOINTMENT (OUTPATIENT)
Dept: CT IMAGING | Facility: CLINIC | Age: 40
End: 2020-12-26
Payer: COMMERCIAL

## 2020-12-26 ENCOUNTER — RESULT REVIEW (OUTPATIENT)
Age: 40
End: 2020-12-26

## 2020-12-26 DIAGNOSIS — Z85.048 PERSONAL HISTORY OF OTHER MALIGNANT NEOPLASM OF RECTUM, RECTOSIGMOID JUNCTION, AND ANUS: Chronic | ICD-10-CM

## 2020-12-26 DIAGNOSIS — Z98.89 OTHER SPECIFIED POSTPROCEDURAL STATES: Chronic | ICD-10-CM

## 2020-12-26 DIAGNOSIS — C20 MALIGNANT NEOPLASM OF RECTUM: ICD-10-CM

## 2020-12-26 PROCEDURE — 74177 CT ABD & PELVIS W/CONTRAST: CPT

## 2020-12-26 PROCEDURE — 74177 CT ABD & PELVIS W/CONTRAST: CPT | Mod: 26

## 2020-12-30 ENCOUNTER — APPOINTMENT (OUTPATIENT)
Dept: HEMATOLOGY ONCOLOGY | Facility: CLINIC | Age: 40
End: 2020-12-30
Payer: COMMERCIAL

## 2020-12-30 ENCOUNTER — RESULT REVIEW (OUTPATIENT)
Age: 40
End: 2020-12-30

## 2020-12-30 VITALS
DIASTOLIC BLOOD PRESSURE: 80 MMHG | OXYGEN SATURATION: 97 % | SYSTOLIC BLOOD PRESSURE: 118 MMHG | HEIGHT: 63.39 IN | BODY MASS INDEX: 26.23 KG/M2 | HEART RATE: 79 BPM | WEIGHT: 149.91 LBS | RESPIRATION RATE: 16 BRPM | TEMPERATURE: 98 F

## 2020-12-30 DIAGNOSIS — L03.90 CELLULITIS, UNSPECIFIED: ICD-10-CM

## 2020-12-30 DIAGNOSIS — Z01.818 ENCOUNTER FOR OTHER PREPROCEDURAL EXAMINATION: ICD-10-CM

## 2020-12-30 DIAGNOSIS — Z86.2 PERSONAL HISTORY OF DISEASES OF THE BLOOD AND BLOOD-FORMING ORGANS AND CERTAIN DISORDERS INVOLVING THE IMMUNE MECHANISM: ICD-10-CM

## 2020-12-30 DIAGNOSIS — Z79.899 OTHER LONG TERM (CURRENT) DRUG THERAPY: ICD-10-CM

## 2020-12-30 LAB
BASOPHILS # BLD AUTO: 0.01 K/UL — SIGNIFICANT CHANGE UP (ref 0–0.2)
BASOPHILS NFR BLD AUTO: 0.2 % — SIGNIFICANT CHANGE UP (ref 0–2)
EOSINOPHIL # BLD AUTO: 0.02 K/UL — SIGNIFICANT CHANGE UP (ref 0–0.5)
EOSINOPHIL NFR BLD AUTO: 0.5 % — SIGNIFICANT CHANGE UP (ref 0–6)
HCT VFR BLD CALC: 38.8 % — SIGNIFICANT CHANGE UP (ref 34.5–45)
HGB BLD-MCNC: 12.2 G/DL — SIGNIFICANT CHANGE UP (ref 11.5–15.5)
IMM GRANULOCYTES NFR BLD AUTO: 0.2 % — SIGNIFICANT CHANGE UP (ref 0–1.5)
LYMPHOCYTES # BLD AUTO: 1.2 K/UL — SIGNIFICANT CHANGE UP (ref 1–3.3)
LYMPHOCYTES # BLD AUTO: 29 % — SIGNIFICANT CHANGE UP (ref 13–44)
MCHC RBC-ENTMCNC: 30.6 PG — SIGNIFICANT CHANGE UP (ref 27–34)
MCHC RBC-ENTMCNC: 31.4 G/DL — LOW (ref 32–36)
MCV RBC AUTO: 97.2 FL — SIGNIFICANT CHANGE UP (ref 80–100)
MONOCYTES # BLD AUTO: 0.19 K/UL — SIGNIFICANT CHANGE UP (ref 0–0.9)
MONOCYTES NFR BLD AUTO: 4.6 % — SIGNIFICANT CHANGE UP (ref 2–14)
NEUTROPHILS # BLD AUTO: 2.71 K/UL — SIGNIFICANT CHANGE UP (ref 1.8–7.4)
NEUTROPHILS NFR BLD AUTO: 65.5 % — SIGNIFICANT CHANGE UP (ref 43–77)
NRBC # BLD: 0 /100 WBCS — SIGNIFICANT CHANGE UP (ref 0–0)
PLATELET # BLD AUTO: 253 K/UL — SIGNIFICANT CHANGE UP (ref 150–400)
RBC # BLD: 3.99 M/UL — SIGNIFICANT CHANGE UP (ref 3.8–5.2)
RBC # FLD: 11.9 % — SIGNIFICANT CHANGE UP (ref 10.3–14.5)
WBC # BLD: 4.14 K/UL — SIGNIFICANT CHANGE UP (ref 3.8–10.5)
WBC # FLD AUTO: 4.14 K/UL — SIGNIFICANT CHANGE UP (ref 3.8–10.5)

## 2020-12-30 PROCEDURE — 99072 ADDL SUPL MATRL&STAF TM PHE: CPT

## 2020-12-30 PROCEDURE — 99214 OFFICE O/P EST MOD 30 MIN: CPT

## 2020-12-31 ENCOUNTER — NON-APPOINTMENT (OUTPATIENT)
Age: 40
End: 2020-12-31

## 2021-01-04 LAB — CEA SERPL-MCNC: 1.3 NG/ML

## 2021-01-04 NOTE — REVIEW OF SYSTEMS
[Fever] : no fever [Fatigue] : no fatigue [Recent Change In Weight] : ~T no recent weight change [Shortness Of Breath] : no shortness of breath [SOB on Exertion] : no shortness of breath during exertion [Suicidal] : not suicidal [Insomnia] : no insomnia [Anxiety] : no anxiety [Depression] : no depression [FreeTextEntry7] : at times secondary to methadone but controlled with Dulcolax

## 2021-01-04 NOTE — ASSESSMENT
[Curative] : Goals of care discussed with patient: Curative [FreeTextEntry1] : Real Kimble is a 39 years old female with newly diagnosed locally advanced rectal adenocarcinoma with clinical stage T3N2Mx completed concurrent capecitabine and radiation on 10/26 and rectal cancer resection on Dec 1, 2015 s/p C6 XELOX adjuvant chemo in the beginning of May, 2016. CT scan in June 2020 showed 2.0cm a cystic lesion in presacral area. She was seen lex Dr. Bai and recent Ct on 12/26/2020 showed resolution.\par We reviewed scan results in detail as well as follow up post 5 years of treatment in detail.  \par \par Plan\par Rectal Cancer : 5 years form sx, 4.5 years form completion of chemotherapy. \par -labs including CEA today\par -RTC yearly with H/P and labs. \par need to follow up Dr. Bai for colonoscopy as per the guideline\par \par Neuropathy: May take acetaminophen or ibuprofen PRN. max doses and with food. \par  \par recommend genetic counseling given young age with rectal cancer although no family history\par \par RTC 12 months for follow up. \par \par Michelle Rios, MSN, ANP-BC\par \par

## 2021-01-04 NOTE — HISTORY OF PRESENT ILLNESS
[de-identified] : Real Kimble is a 35 years old female who initially presented with fresh red blood in the stool in February, 2015, associated with rectal pain. The patient thought it was just hemorrhoids. Until she felt weak, tired with loss of 50lbs in 6 months. She does not have good appetite . She went to see hemorrhoids physician who referred patient to see GI physician Dr. Daren Gifford. She underwent colonoscopy and was found to have a large, fungating, partially obstructing mass in the rectum, close to the anal verge. BIopsy demonstrated invasive moderately differentiated adenocarcinoma. MRI abdomen/pelvis revealed a 5.1cm circumferential mass in the mid rectum, numerous subcentimeter abnormal morphology nodes in the mesorectal fascia, within the deep right pelvis outside of the mesorectal fascia and in the presacral space. \par \par The patient underwent anterior resection with a diverting loop ileostomy on Dec 1, 2015. Surgical pathology demonstrated segment of colon negative for malignant neoplasm and 20 lymph nodes negative for tumor, pT0N0. Patient denies any abdominal pain. \par \par She had ileostomy closed surgery in the end of July, 2016, developed anal pain when having bowel movement. CT-Scan Abd/pelvis/chest of 08/16/16 shows:  Interval  ileostomy  reversal. Presacral  fluid  has  mildly  decreased.  No  metastatic  disease.\par \par S/p XELOX /radiation completion in May 2016. She is feeling fine except she is c/o difficulty with bowel movement and she takes extremely long time to have bowel movement. Admits she has + constipation and + blood in the stools in last a couple of months. Also, she is still requiring pain medication Methadone 5 mg tid 2/2 to persistent numbness/tingling. Denies any fever, diarrhea, nausea and her appetite is good. \par \par 11/1/2019 she has lost her follow up in greater than two years. She feels fine, denies blood in the stool, still complains of tingling and numbness in her fingers and toes, currently on methadone 5mg TID, follows up with Dr. Cheatham. She had last surveillance CT scan on March 24, 2018 which did not show evidence of recurrence. Last colonoscopy In May 2018 showed normal colon. She has lost period in 3 years. \par \par 6/12/2020 CT c/a/p showed  indeterminate 2 x 1.7 cm centrally cystic focus in the presacral region---needs follow up. \par \par 7/10/2020 She has anal pain in last one year, denies blood in the stool, still complains of tingling and numbness in her fingers and toes, currently on methadone 5mg TID, follows up with Dr. Cheatham.  [de-identified] : rectal adenocarcinoma [de-identified] : CEA 7/21/15: 0.9 [de-identified] : 12/30/2020 : Ms. KIMBLE comes in today for follow up. She recently had CT of abd/pelvis on 12/29/2020 showing resolution of 2 cm cystic lesion and otherwise unremarkable scan. She met the 5 year nuris form definitive surgical treatment and 4.5 years our form completion of XelOx treatment. She continues to see Dr. Ora Cheatham for peripheral neuropathy. She is taking methadone daily. She had broken her ankle in September and is healing.

## 2021-08-17 ENCOUNTER — OUTPATIENT (OUTPATIENT)
Dept: OUTPATIENT SERVICES | Facility: HOSPITAL | Age: 41
LOS: 1 days | Discharge: HOME | End: 2021-08-17

## 2021-08-17 DIAGNOSIS — Z85.048 PERSONAL HISTORY OF OTHER MALIGNANT NEOPLASM OF RECTUM, RECTOSIGMOID JUNCTION, AND ANUS: Chronic | ICD-10-CM

## 2021-08-17 DIAGNOSIS — C20 MALIGNANT NEOPLASM OF RECTUM: ICD-10-CM

## 2021-08-17 DIAGNOSIS — Z98.89 OTHER SPECIFIED POSTPROCEDURAL STATES: Chronic | ICD-10-CM

## 2021-08-18 ENCOUNTER — APPOINTMENT (OUTPATIENT)
Dept: HEMATOLOGY ONCOLOGY | Facility: CLINIC | Age: 41
End: 2021-08-18
Payer: COMMERCIAL

## 2021-08-18 PROCEDURE — 99212 OFFICE O/P EST SF 10 MIN: CPT | Mod: 95

## 2021-08-25 NOTE — ASSESSMENT
[FreeTextEntry1] : 41yoF with:\par \par 1. Oxaliplatin-induced peripheral neuropathy - Patient finds that methadone 5mg once daily dosing is optimal in keeping her CIPN symptoms controlled. Will continue at this dose for now with future attempts to wean once again.\par \par 2. Chronic constipation - C/w lactulose PRN.\par \par 3. Urinary retention? - recommend patient to make appointment with Urology. While opioids could contribute to urinary retention this seems to be a new issue in the setting of a relatively low dose of opioid that persists even when she does not take methadone. \par \par 4. Rectal adenocarcinoma - s/p therapies, now with ANGEL. Follows yearly with Oncology. \par \par 5. HCM - Advised patient of the importance of establishing care with a PCP and Gynecologist for well care. \par \par \par Follow up in 3 months

## 2021-08-25 NOTE — HISTORY OF PRESENT ILLNESS
[Home] : at home, [unfilled] , at the time of the visit. [Medical Office: ([facility])___] : at the medical office located in  [Verbal consent obtained from patient] : the patient, [unfilled] [FreeTextEntry1] : 41yoF with rectal adenocarcinoma (dx 7/2015) s/p neoadjuvant chemo with Capcetabine, resection with loop ileostomy (12/2015) and adjuvant chemo with XELOX with subsequent ileostomy closure (7/2016) who presents for palliative care follow up visit via Telemedicine. Patient's last visit with me was 12/2020.\par \par Patient recently lowered her methadone dose to once daily, in the morning.  She takes Aleve on a PRN basis, usually 2-3 times weekly.  The pain is felt mainly in her upper back, with radiation towards her shoulders.  \par \par She tries to go days without methadone and feels an uncomfortable  tingling return in her hands. \par Reports that she has been having difficulty with emptying her bladder completely lately. \par \par ROS:\par +constipation - chronic, since her surgery. Uses lactulose PRN\par Denies nausea/vomiting, constipation.  Occasionally gets bloated/gassy and this can exacerbate pain at surgical site. \par All other ROS negative.\par \par She is , has two children.  Works full time in a dental office. \par \par I-Stop Ref#: [id number]

## 2021-11-15 ENCOUNTER — OUTPATIENT (OUTPATIENT)
Dept: OUTPATIENT SERVICES | Facility: HOSPITAL | Age: 41
LOS: 1 days | Discharge: HOME | End: 2021-11-15

## 2021-11-15 DIAGNOSIS — C20 MALIGNANT NEOPLASM OF RECTUM: ICD-10-CM

## 2021-11-15 DIAGNOSIS — Z85.048 PERSONAL HISTORY OF OTHER MALIGNANT NEOPLASM OF RECTUM, RECTOSIGMOID JUNCTION, AND ANUS: Chronic | ICD-10-CM

## 2021-11-15 DIAGNOSIS — Z98.89 OTHER SPECIFIED POSTPROCEDURAL STATES: Chronic | ICD-10-CM

## 2021-11-17 ENCOUNTER — APPOINTMENT (OUTPATIENT)
Dept: HEMATOLOGY ONCOLOGY | Facility: CLINIC | Age: 41
End: 2021-11-17
Payer: COMMERCIAL

## 2021-11-17 DIAGNOSIS — Z00.00 ENCOUNTER FOR GENERAL ADULT MEDICAL EXAMINATION W/OUT ABNORMAL FINDINGS: ICD-10-CM

## 2021-11-17 PROCEDURE — 99212 OFFICE O/P EST SF 10 MIN: CPT | Mod: 95

## 2021-11-22 NOTE — HISTORY OF PRESENT ILLNESS
[FreeTextEntry1] : 41yoF with rectal adenocarcinoma (dx 7/2015) s/p neoadjuvant chemo with Capecitabine, resection with loop ileostomy (12/2015) and adjuvant chemo with XELOX with subsequent ileostomy closure (7/2016) who presents for palliative care follow up visit.\par \par Interval History (11/17/21): \par Patient continues to utilize methadone once daily, in the morning.  She has been attempting to decrease dose to 2.5mg and is able to on occasion.  She takes Aleve on a PRN basis, usually 2-3 times weekly.  The pain is felt mainly in her upper back, with radiation towards her shoulders.  \par \par She tries to go days without methadone and feels an uncomfortable tingling return in her hands. \par \par ROS:\par +constipation - chronic, since her surgery. Uses lactulose PRN\par Denies nausea/vomiting, constipation.  Occasionally gets bloated/gassy and this can exacerbate pain at surgical site. \par All other ROS negative.\par \par She is , has two children.  Works full time in a dental office. \par \par I-Stop Ref#: [id number]

## 2021-11-22 NOTE — ASSESSMENT
[FreeTextEntry1] : 41yoF with:\par \par 1. Oxaliplatin-induced peripheral neuropathy - Patient finds that methadone 5mg once daily dosing is optimal in keeping her CIPN symptoms controlled. Will continue at this dose for now with future attempts to wean once again.\par \par 2. Chronic constipation - C/w lactulose PRN.\par \par 3. Rectal adenocarcinoma - s/p therapies, now with ANGEL. Follows yearly with Oncology. Will task assistance for follow-up scheduling. \par \par 4. HCM - Advised patient of the importance of establishing care with a PCP and Gynecologist for well care. \par \par Follow up in 3 months.

## 2022-03-15 ENCOUNTER — OUTPATIENT (OUTPATIENT)
Dept: OUTPATIENT SERVICES | Facility: HOSPITAL | Age: 42
LOS: 1 days | Discharge: HOME | End: 2022-03-15

## 2022-03-15 DIAGNOSIS — Z98.89 OTHER SPECIFIED POSTPROCEDURAL STATES: Chronic | ICD-10-CM

## 2022-03-15 DIAGNOSIS — C20 MALIGNANT NEOPLASM OF RECTUM: ICD-10-CM

## 2022-03-15 DIAGNOSIS — Z85.048 PERSONAL HISTORY OF OTHER MALIGNANT NEOPLASM OF RECTUM, RECTOSIGMOID JUNCTION, AND ANUS: Chronic | ICD-10-CM

## 2022-03-16 ENCOUNTER — APPOINTMENT (OUTPATIENT)
Dept: HEMATOLOGY ONCOLOGY | Facility: CLINIC | Age: 42
End: 2022-03-16
Payer: COMMERCIAL

## 2022-03-16 PROCEDURE — 99213 OFFICE O/P EST LOW 20 MIN: CPT | Mod: 95

## 2022-03-16 NOTE — ASSESSMENT
[FreeTextEntry1] : 41yoF with:\par \par 1. Oxaliplatin-induced peripheral neuropathy - Patient finds that methadone 5mg once daily dosing is optimal in keeping her CIPN symptoms controlled. Will continue at this dose for now with future attempts to wean once again.\par \par 2. Chronic constipation - C/w lactulose PRN.\par \par 3. Rectal adenocarcinoma - s/p therapies, now with ANGEL. Follows yearly with Oncology. Will task assistance for follow-up scheduling. \par \par \par Follow up in 6 months

## 2022-03-16 NOTE — HISTORY OF PRESENT ILLNESS
[Home] : at home, [unfilled] , at the time of the visit. [Medical Office: ([facility])___] : at the medical office located in  [Verbal consent obtained from patient] : the patient, [unfilled] [FreeTextEntry1] : 41yoF with rectal adenocarcinoma (dx 7/2015) s/p neoadjuvant chemo with Capecitabine, resection with loop ileostomy (12/2015) and adjuvant chemo with XELOX with subsequent ileostomy closure (7/2016) who presents for palliative care follow up visit.\par \par Interval History (3/16/22): \par Patient continues to utilize methadone 2.5mg once daily, in the morning. She takes Aleve on a PRN basis, usually 2-3 times weekly.  The pain is felt mainly in her upper back, with radiation towards her shoulders.  About two weeks ago she noticed worsening pain in her L neck/shoulder after skipping some doses of the methadone. \par \par ROS:\par +constipation - chronic, since her surgery. Uses lactulose PRN\par Denies nausea/vomiting, \par All other ROS negative.\par \par She is , has two children.  Works full time in a dental office. \par \par I-Stop Ref#: [id number]

## 2022-03-30 RX ORDER — LACTULOSE 10 G/15ML
10 SOLUTION ORAL
Qty: 1 | Refills: 2 | Status: ACTIVE | COMMUNITY
Start: 2020-12-16 | End: 1900-01-01

## 2022-07-12 ENCOUNTER — OUTPATIENT (OUTPATIENT)
Dept: OUTPATIENT SERVICES | Facility: HOSPITAL | Age: 42
LOS: 1 days | Discharge: HOME | End: 2022-07-12

## 2022-07-12 DIAGNOSIS — Z98.89 OTHER SPECIFIED POSTPROCEDURAL STATES: Chronic | ICD-10-CM

## 2022-07-12 DIAGNOSIS — C20 MALIGNANT NEOPLASM OF RECTUM: ICD-10-CM

## 2022-07-12 DIAGNOSIS — Z85.048 PERSONAL HISTORY OF OTHER MALIGNANT NEOPLASM OF RECTUM, RECTOSIGMOID JUNCTION, AND ANUS: Chronic | ICD-10-CM

## 2022-07-25 ENCOUNTER — APPOINTMENT (OUTPATIENT)
Dept: HEMATOLOGY ONCOLOGY | Facility: CLINIC | Age: 42
End: 2022-07-25

## 2022-07-25 ENCOUNTER — APPOINTMENT (OUTPATIENT)
Dept: COLORECTAL SURGERY | Facility: CLINIC | Age: 42
End: 2022-07-25

## 2022-08-24 ENCOUNTER — APPOINTMENT (OUTPATIENT)
Dept: COLORECTAL SURGERY | Facility: CLINIC | Age: 42
End: 2022-08-24

## 2022-08-24 DIAGNOSIS — K59.00 CONSTIPATION, UNSPECIFIED: ICD-10-CM

## 2022-08-24 PROCEDURE — 99212 OFFICE O/P EST SF 10 MIN: CPT

## 2022-08-24 NOTE — HISTORY OF PRESENT ILLNESS
[FreeTextEntry1] : 42-year-old female with a history of rectal cancer here for followup regarding a surveillance colonoscopy. She is currently followed by oncology and has had no evidence of disease. A recent set CAT scan does show a small cystic lesion in the pelvis and will be followed with interval CAT scans. Otherwise, the patient is healthy has gained the weight is employed and has no symptoms.

## 2022-09-19 ENCOUNTER — APPOINTMENT (OUTPATIENT)
Dept: HEMATOLOGY ONCOLOGY | Facility: CLINIC | Age: 42
End: 2022-09-19

## 2022-09-28 ENCOUNTER — OUTPATIENT (OUTPATIENT)
Dept: OUTPATIENT SERVICES | Facility: HOSPITAL | Age: 42
LOS: 1 days | Discharge: HOME | End: 2022-09-28

## 2022-09-28 DIAGNOSIS — Z85.048 PERSONAL HISTORY OF OTHER MALIGNANT NEOPLASM OF RECTUM, RECTOSIGMOID JUNCTION, AND ANUS: Chronic | ICD-10-CM

## 2022-09-28 DIAGNOSIS — Z98.89 OTHER SPECIFIED POSTPROCEDURAL STATES: Chronic | ICD-10-CM

## 2022-09-28 DIAGNOSIS — C20 MALIGNANT NEOPLASM OF RECTUM: ICD-10-CM

## 2022-09-30 ENCOUNTER — APPOINTMENT (OUTPATIENT)
Dept: GERIATRICS | Facility: CLINIC | Age: 42
End: 2022-09-30

## 2022-09-30 PROCEDURE — 99212 OFFICE O/P EST SF 10 MIN: CPT | Mod: 95

## 2022-09-30 NOTE — ASSESSMENT
[FreeTextEntry1] : 41yoF with:\par \par # Oxaliplatin-induced peripheral neuropathy - Patient agreeable to attempt lowering methadone to 2.5mg once daily.  may c/w OTC supplement meant to help nerve healing.\par \par # Chronic constipation - C/w lactulose PRN.\par \par # Rectal adenocarcinoma - s/p therapies, now with ANGEL. Follows yearly with Oncology. Will task assistance for follow-up scheduling. \par \par Follow up in 3 months

## 2022-09-30 NOTE — HISTORY OF PRESENT ILLNESS
[Home] : at home, [unfilled] , at the time of the visit. [Medical Office: ([facility])___] : at the medical office located in  [Verbal consent obtained from patient] : the patient, [unfilled] [FreeTextEntry1] : 42yoF with rectal adenocarcinoma (dx 7/2015) s/p neoadjuvant chemo with Capecitabine, resection with loop ileostomy (12/2015) and adjuvant chemo with XELOX with subsequent ileostomy closure (7/2016) who presents for palliative care follow up visit.\par \par Interval History (9/30/22): \par Patient is seen for follow up via telemedicine. Patient continues to utilize methadone 5mg once daily, in the morning. She takes Advil on a PRN basis, usually 2 times weekly for body aches which only helps minimally. She began Nervive vitamin supplement (contains alpha lipoic acid, Vitamins B6 & B12, turmeric, jennifer). The pain is felt mainly in her neck and upper extremities, described as stiffness and shooting pain. Previous attempts to cut down methadone have resulted in pain worsening.\par She has an upcoming colonoscopy for follow up. \par  \par \par ROS:\par +constipation - chronic, since her surgery. Uses lactulose PRN\par Denies nausea/vomiting, \par All other ROS negative.\par \par She is , has two children.  Works full time in a dental office. \par \par I-Stop Ref#: [id number]

## 2022-10-03 ENCOUNTER — RESULT REVIEW (OUTPATIENT)
Age: 42
End: 2022-10-03

## 2022-10-03 ENCOUNTER — APPOINTMENT (OUTPATIENT)
Dept: HEMATOLOGY ONCOLOGY | Facility: CLINIC | Age: 42
End: 2022-10-03

## 2022-10-03 VITALS
DIASTOLIC BLOOD PRESSURE: 74 MMHG | WEIGHT: 162.48 LBS | OXYGEN SATURATION: 99 % | HEIGHT: 63.78 IN | TEMPERATURE: 97.3 F | HEART RATE: 80 BPM | SYSTOLIC BLOOD PRESSURE: 113 MMHG | BODY MASS INDEX: 28.08 KG/M2 | RESPIRATION RATE: 16 BRPM

## 2022-10-03 LAB
BASOPHILS # BLD AUTO: 0.01 K/UL — SIGNIFICANT CHANGE UP (ref 0–0.2)
BASOPHILS NFR BLD AUTO: 0.3 % — SIGNIFICANT CHANGE UP (ref 0–2)
EOSINOPHIL # BLD AUTO: 0.08 K/UL — SIGNIFICANT CHANGE UP (ref 0–0.5)
EOSINOPHIL NFR BLD AUTO: 2.1 % — SIGNIFICANT CHANGE UP (ref 0–6)
HCT VFR BLD CALC: 36.8 % — SIGNIFICANT CHANGE UP (ref 34.5–45)
HGB BLD-MCNC: 11.7 G/DL — SIGNIFICANT CHANGE UP (ref 11.5–15.5)
IMM GRANULOCYTES NFR BLD AUTO: 0.3 % — SIGNIFICANT CHANGE UP (ref 0–0.9)
LYMPHOCYTES # BLD AUTO: 1.37 K/UL — SIGNIFICANT CHANGE UP (ref 1–3.3)
LYMPHOCYTES # BLD AUTO: 36.1 % — SIGNIFICANT CHANGE UP (ref 13–44)
MCHC RBC-ENTMCNC: 31 PG — SIGNIFICANT CHANGE UP (ref 27–34)
MCHC RBC-ENTMCNC: 31.8 G/DL — LOW (ref 32–36)
MCV RBC AUTO: 97.4 FL — SIGNIFICANT CHANGE UP (ref 80–100)
MONOCYTES # BLD AUTO: 0.16 K/UL — SIGNIFICANT CHANGE UP (ref 0–0.9)
MONOCYTES NFR BLD AUTO: 4.2 % — SIGNIFICANT CHANGE UP (ref 2–14)
NEUTROPHILS # BLD AUTO: 2.16 K/UL — SIGNIFICANT CHANGE UP (ref 1.8–7.4)
NEUTROPHILS NFR BLD AUTO: 57 % — SIGNIFICANT CHANGE UP (ref 43–77)
NRBC # BLD: 0 /100 WBCS — SIGNIFICANT CHANGE UP (ref 0–0)
PLATELET # BLD AUTO: 225 K/UL — SIGNIFICANT CHANGE UP (ref 150–400)
RBC # BLD: 3.78 M/UL — LOW (ref 3.8–5.2)
RBC # FLD: 12.2 % — SIGNIFICANT CHANGE UP (ref 10.3–14.5)
WBC # BLD: 3.79 K/UL — LOW (ref 3.8–10.5)
WBC # FLD AUTO: 3.79 K/UL — LOW (ref 3.8–10.5)

## 2022-10-03 PROCEDURE — 99213 OFFICE O/P EST LOW 20 MIN: CPT

## 2022-10-03 NOTE — PHYSICAL EXAM
[Fully active, able to carry on all pre-disease performance without restriction] : Status 0 - Fully active, able to carry on all pre-disease performance without restriction [Normal] : affect appropriate [de-identified] : wound healing well, nontender and soft, ostomy bag in place

## 2022-10-03 NOTE — HISTORY OF PRESENT ILLNESS
[T: ___] : T[unfilled] [N: ___] : N[unfilled] [M: ___] : M[unfilled] [de-identified] : Real Kimble is a 35 years old female who initially presented with fresh red blood in the stool in February, 2015, associated with rectal pain. The patient thought it was just hemorrhoids. Until she felt weak, tired with loss of 50lbs in 6 months. She does not have good appetite . She went to see hemorrhoids physician who referred patient to see GI physician Dr. Daren Gifford. She underwent colonoscopy and was found to have a large, fungating, partially obstructing mass in the rectum, close to the anal verge. BIopsy demonstrated invasive moderately differentiated adenocarcinoma. MRI abdomen/pelvis revealed a 5.1cm circumferential mass in the mid rectum, numerous subcentimeter abnormal morphology nodes in the mesorectal fascia, within the deep right pelvis outside of the mesorectal fascia and in the presacral space. \par \par The patient underwent anterior resection with a diverting loop ileostomy on Dec 1, 2015. Surgical pathology demonstrated segment of colon negative for malignant neoplasm and 20 lymph nodes negative for tumor, pT0N0. Patient denies any abdominal pain. \par \par She had ileostomy closed surgery in the end of July, 2016, developed anal pain when having bowel movement. CT-Scan Abd/pelvis/chest of 08/16/16 shows:  Interval  ileostomy  reversal. Presacral  fluid  has  mildly  decreased.  No  metastatic  disease.\par \par S/p XELOX /radiation completion in May 2016. She is feeling fine except she is c/o difficulty with bowel movement and she takes extremely long time to have bowel movement. Admits she has + constipation and + blood in the stools in last a couple of months. Also, she is still requiring pain medication Methadone 5 mg tid 2/2 to persistent numbness/tingling. Denies any fever, diarrhea, nausea and her appetite is good. \par \par 11/1/2019 she has lost her follow up in greater than two years. She feels fine, denies blood in the stool, still complains of tingling and numbness in her fingers and toes, currently on methadone 5mg TID, follows up with Dr. Cheatham. She had last surveillance CT scan on March 24, 2018 which did not show evidence of recurrence. Last colonoscopy In May 2018 showed normal colon. She has lost period in 3 years. \par \par 6/12/2020 CT c/a/p showed  indeterminate 2 x 1.7 cm centrally cystic focus in the presacral region---needs follow up. \par \par 7/10/2020 She has anal pain in last one year, denies blood in the stool, still complains of tingling and numbness in her fingers and toes, currently on methadone 5mg TID, follows up with Dr. Cheatham.  [de-identified] : rectal adenocarcinoma [de-identified] : CEA 7/21/15: 0.9 [de-identified] : 12/30/2020 : Ms. KIMBLE comes in today for follow up. She recently had CT of abd/pelvis on 12/29/2020 showing resolution of 2 cm cystic lesion and otherwise unremarkable scan. She met the 5 year nuris form definitive surgical treatment and 4.5 years our form completion of XelOx treatment. She continues to see Dr. Ora Cheatham for peripheral neuropathy. She is taking methadone daily. She had broken her ankle in September and is healing. \par \par 10/3/22 Oxaliplatin induced neuropathy on methadone 5mg daily, colonoscopy will be done on Oct 13. Reports tingling in the fingers and toes was resolved. No blood in the stool. \par

## 2022-10-03 NOTE — REVIEW OF SYSTEMS
[Negative] : Allergic/Immunologic [Fever] : no fever [Fatigue] : no fatigue [Recent Change In Weight] : ~T no recent weight change [Shortness Of Breath] : no shortness of breath [SOB on Exertion] : no shortness of breath during exertion [Constipation] : no constipation [Joint Pain] : no joint pain [Suicidal] : not suicidal [Insomnia] : no insomnia [Anxiety] : no anxiety [Depression] : no depression

## 2022-10-03 NOTE — ASSESSMENT
[Curative] : Goals of care discussed with patient: Curative [FreeTextEntry1] : Real Kimble is a 39 years old female with newly diagnosed locally advanced rectal adenocarcinoma with clinical stage T3N2Mx completed concurrent capecitabine and radiation on 10/26 and rectal cancer resection on Dec 1, 2015 s/p C6 XELOX adjuvant chemo in the beginning of May, 2016. CT scan in June 2020 showed 2.0cm a cystic lesion in presacral area. She was seen lex Dr. Bai and recent Ct on 12/26/2020 showed resolution.\par We reviewed scan results in detail as well as follow up post 7 years of treatment in detail.  \par \par Plan\par Rectal Cancer : \par -labs including CEA today\par -RTC yearly with H/P and labs as needed \par need to follow up Dr. Bai for colonoscopy as per the guideline\par \par Neuropathy: May take acetaminophen or ibuprofen PRN. max doses and with food. \par \par \par RTC 12 months for follow up as needed \par \par \par

## 2022-10-04 LAB
ALBUMIN SERPL ELPH-MCNC: 4.5 G/DL
ALP BLD-CCNC: 48 U/L
ALT SERPL-CCNC: 9 U/L
ANION GAP SERPL CALC-SCNC: 11 MMOL/L
AST SERPL-CCNC: 14 U/L
BILIRUB SERPL-MCNC: 0.2 MG/DL
BUN SERPL-MCNC: 8 MG/DL
CALCIUM SERPL-MCNC: 9.4 MG/DL
CEA SERPL-MCNC: 1 NG/ML
CHLORIDE SERPL-SCNC: 107 MMOL/L
CO2 SERPL-SCNC: 26 MMOL/L
CREAT SERPL-MCNC: 0.71 MG/DL
EGFR: 109 ML/MIN/1.73M2
GLUCOSE SERPL-MCNC: 105 MG/DL
POTASSIUM SERPL-SCNC: 4 MMOL/L
PROT SERPL-MCNC: 6.9 G/DL
SODIUM SERPL-SCNC: 145 MMOL/L

## 2022-10-10 ENCOUNTER — NON-APPOINTMENT (OUTPATIENT)
Age: 42
End: 2022-10-10

## 2022-10-12 LAB — SARS-COV-2 N GENE NPH QL NAA+PROBE: NOT DETECTED

## 2022-10-13 ENCOUNTER — APPOINTMENT (OUTPATIENT)
Dept: COLORECTAL SURGERY | Facility: CLINIC | Age: 42
End: 2022-10-13

## 2022-10-13 PROCEDURE — 45378 DIAGNOSTIC COLONOSCOPY: CPT

## 2023-01-06 ENCOUNTER — APPOINTMENT (OUTPATIENT)
Dept: HEMATOLOGY ONCOLOGY | Facility: CLINIC | Age: 43
End: 2023-01-06

## 2023-01-18 ENCOUNTER — OUTPATIENT (OUTPATIENT)
Dept: OUTPATIENT SERVICES | Facility: HOSPITAL | Age: 43
LOS: 1 days | Discharge: HOME | End: 2023-01-18

## 2023-01-18 DIAGNOSIS — Z98.89 OTHER SPECIFIED POSTPROCEDURAL STATES: Chronic | ICD-10-CM

## 2023-01-18 DIAGNOSIS — Z85.048 PERSONAL HISTORY OF OTHER MALIGNANT NEOPLASM OF RECTUM, RECTOSIGMOID JUNCTION, AND ANUS: Chronic | ICD-10-CM

## 2023-01-18 DIAGNOSIS — C20 MALIGNANT NEOPLASM OF RECTUM: ICD-10-CM

## 2023-02-10 ENCOUNTER — APPOINTMENT (OUTPATIENT)
Dept: GERIATRICS | Facility: CLINIC | Age: 43
End: 2023-02-10
Payer: COMMERCIAL

## 2023-02-10 PROCEDURE — 99213 OFFICE O/P EST LOW 20 MIN: CPT | Mod: 95

## 2023-02-13 NOTE — ASSESSMENT
[FreeTextEntry1] : 41yoF with:\par \par # Oxaliplatin-induced peripheral neuropathy - Initiate taper of methadone - every other day dosing.  may c/w OTC supplement meant to help nerve healing.\par \par # Chronic constipation - C/w lactulose PRN.\par \par # Rectal adenocarcinoma - s/p therapies, now with ANGEL. Follows yearly with Oncology. Will task assistance for follow-up scheduling. \par \par Follow up in 3 months

## 2023-02-13 NOTE — HISTORY OF PRESENT ILLNESS
[Home] : at home, [unfilled] , at the time of the visit. [Medical Office: ([facility])___] : at the medical office located in  [Verbal consent obtained from patient] : the patient, [unfilled] [FreeTextEntry1] : 42yoF with rectal adenocarcinoma (dx 7/2015) s/p neoadjuvant chemo with Capecitabine, resection with loop ileostomy (12/2015) and adjuvant chemo with XELOX with subsequent ileostomy closure (7/2016) who presents for palliative care follow up visit.\par \par Interval History (2/10/23): \par Patient is seen for follow up via telemedicine. Patient continues to use methadone 5mg once daily, in the morning. She takes Advil on a PRN basis, usually 2 times weekly for body aches which only helps minimally. She began Nervive vitamin supplement (contains alpha lipoic acid, Vitamins B6 & B12, turmeric, jennifer). The pain is felt mainly in her neck and upper extremities, described as stiffness and shooting pain. Previous attempts to cut down methadone have resulted in pain worsening but she would like to attempt to taper. \par \par ROS:\par +constipation - chronic, since her surgery. Uses lactulose PRN\par Denies nausea/vomiting, \par All other ROS negative.\par \par She is , has two children.  Works full time in a dental office. \par \par I-Stop Ref#: [id number]

## 2023-08-22 ENCOUNTER — APPOINTMENT (OUTPATIENT)
Dept: GERIATRICS | Facility: CLINIC | Age: 43
End: 2023-08-22
Payer: COMMERCIAL

## 2023-08-22 PROCEDURE — 99212 OFFICE O/P EST SF 10 MIN: CPT | Mod: 95

## 2023-08-22 NOTE — ASSESSMENT
[FreeTextEntry1] : 41yoF with:  # Oxaliplatin-induced peripheral neuropathy - Initiate taper of methadone - every other day dosing.  may c/w OTC supplement meant to help nerve healing.  # Chronic constipation - C/w lactulose PRN.  # Rectal adenocarcinoma - s/p therapies, now with ANGEL. Follows yearly with Oncology. Will task assistance for follow-up scheduling.   Follow up in 6 months

## 2023-08-22 NOTE — HISTORY OF PRESENT ILLNESS
[Home] : at home, [unfilled] , at the time of the visit. [Medical Office: ([facility])___] : at the medical office located in  [Verbal consent obtained from patient] : the patient, [unfilled] [FreeTextEntry1] : 43yoF with rectal adenocarcinoma (dx 7/2015) s/p neoadjuvant chemo with Capecitabine, resection with loop ileostomy (12/2015) and adjuvant chemo with XELOX with subsequent ileostomy closure (7/2016) who presents for palliative care follow up visit.  Interval History (8/22/23):  Patient seen via telemedicine for palliative medicine follow up. Continues to use methadone 5mg once daily, in the morning. She takes Advil on a PRN basis, usually 2-3 times weekly for body aches which only helps minimally. The pain is felt mainly in her neck and upper extremities, described as stiffness and shooting pain. Previous attempts to cut down methadone have resulted in pain worsening. She reports that her  was diagnosed with an incurable cancer, is getting treatment at [facility]. This has been very stressful for her whole family.   ROS: +constipation - chronic, since her surgery. Uses lactulose PRN Denies nausea/vomiting,  All other ROS negative.  She is , has two children.  Works full time in a dental office.   I-Stop Ref#: [id number]

## 2024-05-02 ENCOUNTER — APPOINTMENT (OUTPATIENT)
Dept: GERIATRICS | Facility: CLINIC | Age: 44
End: 2024-05-02
Payer: MEDICAID

## 2024-05-02 DIAGNOSIS — C20 MALIGNANT NEOPLASM OF RECTUM: ICD-10-CM

## 2024-05-02 DIAGNOSIS — G62.0 DRUG-INDUCED POLYNEUROPATHY: ICD-10-CM

## 2024-05-02 PROCEDURE — 99213 OFFICE O/P EST LOW 20 MIN: CPT

## 2024-05-02 NOTE — ASSESSMENT
[FreeTextEntry1] : 43yoF with:  # Oxaliplatin-induced peripheral neuropathy - Will continue to slow taper methadone . Patient finds that 2.5mg  once daily  (#15 pills per month) is sometimes not enough for the body pains she has.  She requests if she can have #20pills per month to allow for an extra dose on some more painful days. Will do so.    # Acute grief - patient just lost her  to cancer. Provided empathetic support.  # Chronic constipation - C/w lactulose PRN.  # Rectal adenocarcinoma - s/p therapies, now with ANGEL. Follows yearly with colorectal surgery.   Follow up in 6 months

## 2024-05-02 NOTE — HISTORY OF PRESENT ILLNESS
[Home] : at home, [unfilled] , at the time of the visit. [Medical Office: ([facility])___] : at the medical office located in  [Verbal consent obtained from patient] : the patient, [unfilled] [FreeTextEntry1] : 43yoF with rectal adenocarcinoma (dx 2015) s/p neoadjuvant chemo with Capecitabine, resection with loop ileostomy (2015) and adjuvant chemo with XELOX with subsequent ileostomy closure (2016) who presents for palliative care follow up visit.  Interval History (24):  Patient seen via telemedicine for palliative medicine follow up. Her   last week, he had terminal cancer. She has been using 2.5mg of methadone once daily for many months now. There are some days where she experiences body pains that are worse than other days.    ROS: +constipation - chronic, since her surgery. Uses lactulose PRN Denies nausea/vomiting,  All other ROS negative.  She is , has two children.  Works full time in a dental office.   I-Stop Ref#: [id number]

## 2024-12-02 NOTE — DATA REVIEWED
[FreeTextEntry1] : CT A/P (6/2020) - \par LUNGS AND LARGE AIRWAYS: Patent central airways. No pulmonary nodules. \par PLEURA: No pleural effusion. \par VESSELS: Within normal limits. \par HEART: Heart size is normal. No pericardial effusion. \par MEDIASTINUM AND AARON: No lymphadenopathy. \par CHEST WALL AND LOWER NECK: Within normal limits. \par \par ABDOMEN AND PELVIS: \par LIVER: Within normal limits. \par BILE DUCTS: Normal caliber. \par GALLBLADDER: Within normal limits. \par SPLEEN: Within normal limits. \par PANCREAS: Within normal limits. \par ADRENALS: Within normal limits. \par KIDNEYS/URETERS: Within normal limits. \par \par BLADDER: Within normal limits. \par REPRODUCTIVE ORGANS: Air is present within the endometrial cavity. The \par adnexal regions unremarkable \par \par BOWEL: Status post low anterior resection. Multiple nonopacified small bowel loops extend into the pelvis. There is an indeterminate 2 x 1.7 cm centrally cystic focus in the presacral region (3:255). Suggest short interval follow-up. It is possible that this represents a postoperative focus, but a \par focus of recurrent disease cannot be excluded. No bowel obstruction. \par Appendix is normal. \par PERITONEUM: No ascites. \par VESSELS: Within normal limits. \par RETROPERITONEUM/LYMPH NODES: No lymphadenopathy. \par ABDOMINAL WALL: Within normal limits. \par BONES: Within normal limits. \par \par IMPRESSION: \par No developing adenopathy nor hepatic mass. \par Centrally cystic focus of the presacral area for which short interval \par follow-up or PET CT suggested. \par Air is present within the endometrial cavity.  PAST SURGICAL HISTORY:  H/O colonoscopy     History of nasal septoplasty     S/P tonsillectomy     S/P uvulopalatopharyngoplasty

## 2024-12-05 ENCOUNTER — APPOINTMENT (OUTPATIENT)
Dept: GERIATRICS | Facility: CLINIC | Age: 44
End: 2024-12-05
Payer: COMMERCIAL

## 2024-12-05 DIAGNOSIS — G62.0 DRUG-INDUCED POLYNEUROPATHY: ICD-10-CM

## 2024-12-05 DIAGNOSIS — C20 MALIGNANT NEOPLASM OF RECTUM: ICD-10-CM

## 2024-12-05 PROCEDURE — 99213 OFFICE O/P EST LOW 20 MIN: CPT

## 2025-08-01 ENCOUNTER — APPOINTMENT (OUTPATIENT)
Dept: GERIATRICS | Facility: CLINIC | Age: 45
End: 2025-08-01
Payer: COMMERCIAL

## 2025-08-01 DIAGNOSIS — G62.0 DRUG-INDUCED POLYNEUROPATHY: ICD-10-CM

## 2025-08-01 DIAGNOSIS — K59.00 CONSTIPATION, UNSPECIFIED: ICD-10-CM

## 2025-08-01 DIAGNOSIS — C20 MALIGNANT NEOPLASM OF RECTUM: ICD-10-CM

## 2025-08-01 PROCEDURE — 99214 OFFICE O/P EST MOD 30 MIN: CPT | Mod: 95
